# Patient Record
Sex: FEMALE | Race: WHITE | NOT HISPANIC OR LATINO | Employment: UNEMPLOYED | URBAN - NONMETROPOLITAN AREA
[De-identification: names, ages, dates, MRNs, and addresses within clinical notes are randomized per-mention and may not be internally consistent; named-entity substitution may affect disease eponyms.]

---

## 2019-02-02 ENCOUNTER — HOSPITAL ENCOUNTER (EMERGENCY)
Facility: HOSPITAL | Age: 19
Discharge: HOME | End: 2019-02-02
Attending: NURSE PRACTITIONER | Admitting: NURSE PRACTITIONER
Payer: COMMERCIAL

## 2019-02-02 VITALS
RESPIRATION RATE: 16 BRPM | OXYGEN SATURATION: 100 % | TEMPERATURE: 98.8 F | DIASTOLIC BLOOD PRESSURE: 68 MMHG | HEART RATE: 79 BPM | SYSTOLIC BLOOD PRESSURE: 102 MMHG

## 2019-02-02 DIAGNOSIS — Z20.818 EXPOSURE TO STREP THROAT: ICD-10-CM

## 2019-02-02 DIAGNOSIS — J02.9 ACUTE PHARYNGITIS, UNSPECIFIED ETIOLOGY: ICD-10-CM

## 2019-02-02 PROCEDURE — 99203 OFFICE O/P NEW LOW 30 MIN: CPT | Mod: Z6 | Performed by: NURSE PRACTITIONER

## 2019-02-02 PROCEDURE — 25000132 ZZH RX MED GY IP 250 OP 250 PS 637: Performed by: NURSE PRACTITIONER

## 2019-02-02 PROCEDURE — G0463 HOSPITAL OUTPT CLINIC VISIT: HCPCS

## 2019-02-02 RX ORDER — AZITHROMYCIN 250 MG/1
250 TABLET, FILM COATED ORAL DAILY
Qty: 4 TABLET | Refills: 0 | Status: SHIPPED | OUTPATIENT
Start: 2019-02-02 | End: 2019-08-06

## 2019-02-02 RX ORDER — AZITHROMYCIN 250 MG/1
500 TABLET, FILM COATED ORAL ONCE
Status: COMPLETED | OUTPATIENT
Start: 2019-02-02 | End: 2019-02-02

## 2019-02-02 RX ADMIN — AZITHROMYCIN 500 MG: 250 TABLET, FILM COATED ORAL at 20:44

## 2019-02-02 ASSESSMENT — ENCOUNTER SYMPTOMS
FEVER: 1
FATIGUE: 1
APPETITE CHANGE: 1
CHILLS: 1

## 2019-02-02 NOTE — ED AVS SNAPSHOT
[facility]  [address] [address]  Phone:  [phone]                                    Leanna Zapata   MRN: [id number]    Department:  [facility]   Date of Visit:  2/2/2019           After Visit Summary Signature Page    I have received my discharge instructions, and my questions have been answered. I have discussed any challenges I see with this plan with the nurse or doctor.    ..........................................................................................................................................  Patient/Patient Representative Signature      ..........................................................................................................................................  Patient Representative Print Name and Relationship to Patient    ..................................................               ................................................  Date                                   Time    ..........................................................................................................................................  Reviewed by Signature/Title    ...................................................              ..............................................  Date                                               Time          22EPIC Rev 08/18

## 2019-02-03 NOTE — ED PROVIDER NOTES
History     Chief Complaint   Patient presents with     Pharyngitis     HPI  Leanna Zapata is a 18 year old female who presents today with a CC of sore throat, fever, nausea and abdominal pain x 4 days.  Her SO just tested positive for strep throat.  She has been taking razia seltzer for pain with some improvement.      Allergies:  Allergies   Allergen Reactions     Bees      Penicillins      GI upset.       Problem List:    There are no active problems to display for this patient.       Past Medical History:    Past Medical History:   Diagnosis Date     Dysthymic disorder      Insomnia        Past Surgical History:    Past Surgical History:   Procedure Laterality Date     OTHER SURGICAL HISTORY      [id number],NO PREVIOUS SURGERY       Family History:    Family History   Problem Relation Age of Onset     Cancer Mother         Cancer,cervical, dx around age of 34yo     Cancer Maternal Grandmother         Cancer,lymph node and breast cancer       Social History:  Marital Status:  Single [1]  Social History     Tobacco Use     Smoking status: Never Smoker     Smokeless tobacco: Never Used   Substance Use Topics     Alcohol use: No     Drug use: Unknown     Types: Other     Comment: Drug use: No        Medications:      azithromycin (ZITHROMAX Z-SHANNAN) 250 MG tablet   sodium-potassium bicarbonate (RAZIA-SELTZER GOLD) TBEF solu-tab         Review of Systems   Constitutional: Positive for appetite change, chills, fatigue and fever.   HENT: Positive for sore throat.    Respiratory: Positive for cough (mild).    Gastrointestinal: Positive for abdominal pain and nausea.   Genitourinary: Negative for dysuria.   Musculoskeletal: Positive for arthralgias and myalgias.   Skin: Negative for rash.   Neurological: Positive for headaches.       Physical Exam   BP: 102/68  Pulse: 79  Temp: 98.8  F (37.1  C)  Resp: 16  SpO2: 100 %      Physical Exam   Constitutional: She appears well-developed. She is cooperative.  Non-toxic appearance.  "She does not appear ill.   HENT:   Head: Normocephalic and atraumatic.   Right Ear: Tympanic membrane, external ear and ear canal normal.   Left Ear: Tympanic membrane, external ear and ear canal normal.   Mouth/Throat: Uvula is midline. Posterior oropharyngeal erythema present. Tonsils are 1+ on the right. Tonsils are 1+ on the left.   Cardiovascular: Normal rate and regular rhythm.   Pulmonary/Chest: Effort normal and breath sounds normal.   Abdominal: Soft. Bowel sounds are normal. There is no hepatosplenomegaly. There is generalized tenderness (mild). There is no rigidity, no rebound and no guarding.   No peritoneal signs   Neurological: She is alert.   Nursing note and vitals reviewed.      ED Course        Procedures    No results found for this or any previous visit.    Assessments & Plan (with Medical Decision Making)     I have reviewed the nursing notes.    I have reviewed the findings, diagnosis, plan and need for follow up with the patient.  ASSESSMENT / PLAN:  (J02.9) Acute pharyngitis, unspecified etiology  Comment: SO tested positive, symptoms are same as his, they live together, will treat  Plan:  Zithromax as prescribed    Warm salt water gargles several times per day   Ibuprofen and tylenol per package directions for pain/fever   Lozenges per package directions as needed   Stay well hydrated, wash hands frequently, get plenty of rest   You are contagious for the first 24 hours after starting treatment   Discard toothbrush after 24-48 hours and get a new one   Patient verbally educated and given appropriate education sheets for their diagnoses and has no questions.   Return to ED/UC if symptoms increase or concerns develop, red flag symptoms as discussed and per discharge instructions:    increasing throat pain, trouble swallowing, \"hot potato voice\", drooling, shortness of breath/airway compromise   Follow up with your Primary Care provider if symptoms do not improve in 2-3 days      (Z20.004) " Exposure to strep throat         Medication List      Started    azithromycin 250 MG tablet  Commonly known as:  ZITHROMAX Z-SHANNAN  250 mg, Oral, DAILY            Final diagnoses:   Acute pharyngitis, unspecified etiology   Exposure to strep throat       2/2/2019   [facility]     Saundra Cantrell NP  02/04/19 0642

## 2019-02-04 ASSESSMENT — ENCOUNTER SYMPTOMS
ABDOMINAL PAIN: 1
COUGH: 1
NAUSEA: 1
DYSURIA: 0
SORE THROAT: 1
MYALGIAS: 1
ARTHRALGIAS: 1
HEADACHES: 1

## 2019-04-12 ENCOUNTER — APPOINTMENT (OUTPATIENT)
Dept: GENERAL RADIOLOGY | Facility: HOSPITAL | Age: 19
End: 2019-04-12
Attending: FAMILY MEDICINE
Payer: COMMERCIAL

## 2019-04-12 ENCOUNTER — HOSPITAL ENCOUNTER (EMERGENCY)
Facility: HOSPITAL | Age: 19
Discharge: HOME | End: 2019-04-12
Attending: FAMILY MEDICINE | Admitting: FAMILY MEDICINE
Payer: COMMERCIAL

## 2019-04-12 VITALS
DIASTOLIC BLOOD PRESSURE: 64 MMHG | RESPIRATION RATE: 20 BRPM | WEIGHT: 140.43 LBS | SYSTOLIC BLOOD PRESSURE: 114 MMHG | OXYGEN SATURATION: 99 % | TEMPERATURE: 98.9 F

## 2019-04-12 DIAGNOSIS — F41.9 ANXIETY: ICD-10-CM

## 2019-04-12 DIAGNOSIS — R10.84 ABDOMINAL PAIN, GENERALIZED: Primary | ICD-10-CM

## 2019-04-12 DIAGNOSIS — K29.70 GASTRITIS WITHOUT BLEEDING, UNSPECIFIED CHRONICITY, UNSPECIFIED GASTRITIS TYPE: ICD-10-CM

## 2019-04-12 LAB
ALBUMIN SERPL-MCNC: 4.8 G/DL (ref 3.4–5)
ALBUMIN UR-MCNC: NEGATIVE MG/DL
ALP SERPL-CCNC: 61 U/L (ref 40–150)
ALT SERPL W P-5'-P-CCNC: 20 U/L (ref 0–50)
ANION GAP SERPL CALCULATED.3IONS-SCNC: 10 MMOL/L (ref 3–14)
APPEARANCE UR: CLEAR
AST SERPL W P-5'-P-CCNC: 12 U/L (ref 0–35)
BACTERIA #/AREA URNS HPF: ABNORMAL /HPF
BASOPHILS # BLD AUTO: 0 10E9/L (ref 0–0.2)
BASOPHILS NFR BLD AUTO: 0.4 %
BILIRUB SERPL-MCNC: 0.7 MG/DL (ref 0.2–1.3)
BILIRUB UR QL STRIP: NEGATIVE
BUN SERPL-MCNC: 9 MG/DL (ref 7–30)
CALCIUM SERPL-MCNC: 9.8 MG/DL (ref 8.5–10.1)
CHLORIDE SERPL-SCNC: 108 MMOL/L (ref 96–110)
CO2 SERPL-SCNC: 22 MMOL/L (ref 20–32)
COLOR UR AUTO: ABNORMAL
CREAT SERPL-MCNC: 0.85 MG/DL (ref 0.5–1)
D DIMER PPP DDU-MCNC: <200 NG/ML D-DU (ref 0–300)
DIFFERENTIAL METHOD BLD: NORMAL
EOSINOPHIL # BLD AUTO: 0 10E9/L (ref 0–0.7)
EOSINOPHIL NFR BLD AUTO: 0.3 %
ERYTHROCYTE [DISTWIDTH] IN BLOOD BY AUTOMATED COUNT: 12.7 % (ref 10–15)
GFR SERPL CREATININE-BSD FRML MDRD: >90 ML/MIN/{1.73_M2}
GLUCOSE SERPL-MCNC: 128 MG/DL (ref 70–99)
GLUCOSE UR STRIP-MCNC: NEGATIVE MG/DL
HCT VFR BLD AUTO: 41.4 % (ref 35–47)
HGB BLD-MCNC: 14.7 G/DL (ref 11.7–15.7)
HGB UR QL STRIP: NEGATIVE
IMM GRANULOCYTES # BLD: 0 10E9/L (ref 0–0.4)
IMM GRANULOCYTES NFR BLD: 0.3 %
KETONES UR STRIP-MCNC: 5 MG/DL
LEUKOCYTE ESTERASE UR QL STRIP: NEGATIVE
LYMPHOCYTES # BLD AUTO: 2.9 10E9/L (ref 0.8–5.3)
LYMPHOCYTES NFR BLD AUTO: 36.8 %
MCH RBC QN AUTO: 29.8 PG (ref 26.5–33)
MCHC RBC AUTO-ENTMCNC: 35.5 G/DL (ref 31.5–36.5)
MCV RBC AUTO: 84 FL (ref 78–100)
MONOCYTES # BLD AUTO: 0.5 10E9/L (ref 0–1.3)
MONOCYTES NFR BLD AUTO: 5.8 %
MUCOUS THREADS #/AREA URNS LPF: PRESENT /LPF
NEUTROPHILS # BLD AUTO: 4.4 10E9/L (ref 1.6–8.3)
NEUTROPHILS NFR BLD AUTO: 56.4 %
NITRATE UR QL: NEGATIVE
NRBC # BLD AUTO: 0 10*3/UL
NRBC BLD AUTO-RTO: 0 /100
PH UR STRIP: 6.5 PH (ref 4.7–8)
PLATELET # BLD AUTO: 219 10E9/L (ref 150–450)
POTASSIUM SERPL-SCNC: 3.2 MMOL/L (ref 3.4–5.3)
PROT SERPL-MCNC: 8.6 G/DL (ref 6.8–8.8)
RBC # BLD AUTO: 4.93 10E12/L (ref 3.8–5.2)
RBC #/AREA URNS AUTO: 1 /HPF (ref 0–2)
SODIUM SERPL-SCNC: 140 MMOL/L (ref 133–144)
SOURCE: ABNORMAL
SP GR UR STRIP: 1.01 (ref 1–1.03)
UROBILINOGEN UR STRIP-MCNC: NORMAL MG/DL (ref 0–2)
WBC # BLD AUTO: 7.8 10E9/L (ref 4–11)
WBC #/AREA URNS AUTO: <1 /HPF (ref 0–5)

## 2019-04-12 PROCEDURE — 25000125 ZZHC RX 250: Performed by: FAMILY MEDICINE

## 2019-04-12 PROCEDURE — 80053 COMPREHEN METABOLIC PANEL: CPT | Performed by: FAMILY MEDICINE

## 2019-04-12 PROCEDURE — 25000132 ZZH RX MED GY IP 250 OP 250 PS 637: Performed by: FAMILY MEDICINE

## 2019-04-12 PROCEDURE — 85379 FIBRIN DEGRADATION QUANT: CPT | Performed by: FAMILY MEDICINE

## 2019-04-12 PROCEDURE — 99284 EMERGENCY DEPT VISIT MOD MDM: CPT | Mod: Z6 | Performed by: FAMILY MEDICINE

## 2019-04-12 PROCEDURE — 96361 HYDRATE IV INFUSION ADD-ON: CPT

## 2019-04-12 PROCEDURE — 25800030 ZZH RX IP 258 OP 636: Performed by: FAMILY MEDICINE

## 2019-04-12 PROCEDURE — 81001 URINALYSIS AUTO W/SCOPE: CPT | Performed by: FAMILY MEDICINE

## 2019-04-12 PROCEDURE — 96374 THER/PROPH/DIAG INJ IV PUSH: CPT

## 2019-04-12 PROCEDURE — 36415 COLL VENOUS BLD VENIPUNCTURE: CPT | Performed by: FAMILY MEDICINE

## 2019-04-12 PROCEDURE — 99284 EMERGENCY DEPT VISIT MOD MDM: CPT | Mod: 25

## 2019-04-12 PROCEDURE — 85025 COMPLETE CBC W/AUTO DIFF WBC: CPT | Performed by: FAMILY MEDICINE

## 2019-04-12 PROCEDURE — 74019 RADEX ABDOMEN 2 VIEWS: CPT | Mod: TC

## 2019-04-12 PROCEDURE — 25000128 H RX IP 250 OP 636: Performed by: FAMILY MEDICINE

## 2019-04-12 PROCEDURE — 71046 X-RAY EXAM CHEST 2 VIEWS: CPT | Mod: TC

## 2019-04-12 RX ORDER — POTASSIUM CHLORIDE 20MEQ/15ML
40 LIQUID (ML) ORAL ONCE
Status: COMPLETED | OUTPATIENT
Start: 2019-04-12 | End: 2019-04-12

## 2019-04-12 RX ORDER — SODIUM CHLORIDE 9 MG/ML
INJECTION, SOLUTION INTRAVENOUS CONTINUOUS
Status: DISCONTINUED | OUTPATIENT
Start: 2019-04-12 | End: 2019-04-13 | Stop reason: HOSPADM

## 2019-04-12 RX ORDER — FAMOTIDINE 20 MG/1
20 TABLET, FILM COATED ORAL 2 TIMES DAILY
Qty: 60 TABLET | Refills: 0 | Status: SHIPPED | OUTPATIENT
Start: 2019-04-12

## 2019-04-12 RX ADMIN — POTASSIUM CHLORIDE 40 MEQ: 20 SOLUTION ORAL at 21:38

## 2019-04-12 RX ADMIN — SODIUM CHLORIDE: 9 INJECTION, SOLUTION INTRAVENOUS at 21:27

## 2019-04-12 RX ADMIN — FAMOTIDINE 20 MG: 10 INJECTION, SOLUTION INTRAVENOUS at 21:43

## 2019-04-12 RX ADMIN — SODIUM CHLORIDE 1000 ML: 9 INJECTION, SOLUTION INTRAVENOUS at 20:36

## 2019-04-12 ASSESSMENT — ENCOUNTER SYMPTOMS
SHORTNESS OF BREATH: 1
CHILLS: 1
ACTIVITY CHANGE: 1
NEUROLOGICAL NEGATIVE: 1
CARDIOVASCULAR NEGATIVE: 1
MUSCULOSKELETAL NEGATIVE: 1
CHEST TIGHTNESS: 1
EYES NEGATIVE: 1
APPETITE CHANGE: 0
PSYCHIATRIC NEGATIVE: 1
HEMATOLOGIC/LYMPHATIC NEGATIVE: 1
FEVER: 0
ABDOMINAL PAIN: 1
DIARRHEA: 1
FATIGUE: 1
NAUSEA: 1

## 2019-04-12 NOTE — ED AVS SNAPSHOT
[facility]  [address] [address]  Phone:  [phone]                                    Leanna Zapata   MRN: [id number]    Department:  [facility]   Date of Visit:  4/12/2019           After Visit Summary Signature Page    I have received my discharge instructions, and my questions have been answered. I have discussed any challenges I see with this plan with the nurse or doctor.    ..........................................................................................................................................  Patient/Patient Representative Signature      ..........................................................................................................................................  Patient Representative Print Name and Relationship to Patient    ..................................................               ................................................  Date                                   Time    ..........................................................................................................................................  Reviewed by Signature/Title    ...................................................              ..............................................  Date                                               Time          22EPIC Rev 08/18

## 2019-04-13 NOTE — ED NOTES
"Pt has scarring on left inner arm from cutting. States \" my circulation is all screwed up in that arm from scars\". Pt states she has never had an IV and begins to hyperventilate and cry when nurse even looking for a site. Able to get an IV into her right AC.  "

## 2019-04-13 NOTE — ED PROVIDER NOTES
"  History     Chief Complaint   Patient presents with     Abdominal Pain     Diarrhea     Dizziness     HPI  Leanna Zapata is a 18 year old female who presents the emergency room with increasing left lung pain and mid chest pain.  She states she has been more lightheaded than usual and that the lung pain is worsened in the last 2-3 weeks.  It did not suddenly worsened today.  She notes that she has \"extreme back pain and uses crutches when she gets up and is not a very active person.  She has a history of being a contortion S and states that she  thinks that her back pain is likely from this activity.  She has not had an official diagnosis.  She has not had any workup for her back.    Patient does admit that she has been on cimetidine in the past for what was thought to be ulcer disease.    Allergies:  Allergies   Allergen Reactions     Bees      Penicillins      GI upset.       Problem List:    There are no active problems to display for this patient.       Past Medical History:    Past Medical History:   Diagnosis Date     Dysthymic disorder      Insomnia        Past Surgical History:    Past Surgical History:   Procedure Laterality Date     OTHER SURGICAL HISTORY      [id number],NO PREVIOUS SURGERY       Family History:    Family History   Problem Relation Age of Onset     Cancer Mother         Cancer,cervical, dx around age of 34yo     Cancer Maternal Grandmother         Cancer,lymph node and breast cancer       Social History:  Marital Status:  Single [1]  Social History     Tobacco Use     Smoking status: Current Every Day Smoker     Packs/day: 0.25     Years: 4.00     Pack years: 1.00     Smokeless tobacco: Never Used     Tobacco comment: States she is cutting back   Substance Use Topics     Alcohol use: No     Drug use: Yes     Types: Other, Marijuana     Comment: Drug use: No        Medications:      famotidine (PEPCID) 20 MG tablet   azithromycin (ZITHROMAX Z-SHANNAN) 250 MG tablet   sodium-potassium bicarbonate " ----- Message from Mejia Snider MD sent at 8/1/2017  4:51 PM CDT -----  Triglycerides elevated. Watch diet i.e. low-fat and low carb diet. Repeat lipid panel in 6 months  Blood sugars normal  The rest of lab work within normal range.   (RAZIA-SELTZER GOLD) TBEF solu-tab         Review of Systems   Constitutional: Positive for activity change, chills and fatigue. Negative for appetite change and fever.   HENT: Negative.    Eyes: Negative.    Respiratory: Positive for chest tightness and shortness of breath.    Cardiovascular: Negative.    Gastrointestinal: Positive for abdominal pain, diarrhea and nausea.   Genitourinary: Negative.    Musculoskeletal: Negative.    Neurological: Negative.    Hematological: Negative.    Psychiatric/Behavioral: Negative.        Physical Exam   BP: 100/71  Heart Rate: (!) 135  Temp: 98.2  F (36.8  C)  Resp: 16  Weight: 63.7 kg (140 lb 6.9 oz)  SpO2: 99 %      Physical Exam   Constitutional: She is oriented to person, place, and time. She appears well-developed and well-nourished. No distress.   HENT:   Head: Normocephalic and atraumatic.   Right Ear: External ear normal.   Left Ear: External ear normal.   Nose: Nose normal.   Mouth/Throat: Oropharynx is clear and moist.   Eyes: Pupils are equal, round, and reactive to light. Conjunctivae and EOM are normal.   Neck: Normal range of motion. Neck supple. No thyromegaly present.   Cardiovascular: Normal rate, regular rhythm and normal heart sounds. Exam reveals no gallop.   No murmur heard.  Pulmonary/Chest: Effort normal and breath sounds normal. No stridor. No respiratory distress. She has no wheezes.   Abdominal: Soft. Bowel sounds are normal. She exhibits no distension. There is no tenderness. There is no guarding.   Musculoskeletal: Normal range of motion.   Lymphadenopathy:     She has no cervical adenopathy.   Neurological: She is alert and oriented to person, place, and time.   Skin: Skin is warm and dry. Capillary refill takes less than 2 seconds. She is not diaphoretic.   Nursing note and vitals reviewed.      ED Course   Patient seen and examined. Labs ordered. CXR AND ABD XR done.   ED Course as of Apr 12 2259 Fri Apr 12, 2019   2130 Potassium ordered    Potassium(!): 3.2   2226 CXR and ABD Xray unremarkable.         Procedures          Discussed results with patient and her significant other.  No obvious abnormalities are found today.  She did have a low potassium and we treated that.  Her chest and abdominal x-rays are negative.    We will send her home on Pepcid 20 mg p.o. twice daily Rx given that she can pick this up over-the-counter and she was told that.    We discussed her activity level and I strongly encouraged her to consider water therapy at a local pool to help with her strength until she is able to do some inconsistent walking in her activity.    Patient asked about anxiety medications as she has severe anxiety I suggested to her to consider ongoing therapy and she has been in therapy and on medications in the past.  Did talk to her about yoga which she is doing intermittently with her significant other.  Did also talk to her about calming techniques.  Suggested meditation and focusing exercises so that she can bring herself down fairly rapidly.  She was comfortable with that plan.  She will follow-up if any worsening or change in her symptoms.         Results for orders placed or performed during the hospital encounter of 04/12/19 (from the past 24 hour(s))   CBC with platelets differential   Result Value Ref Range    WBC 7.8 4.0 - 11.0 10e9/L    RBC Count 4.93 3.8 - 5.2 10e12/L    Hemoglobin 14.7 11.7 - 15.7 g/dL    Hematocrit 41.4 35.0 - 47.0 %    MCV 84 78 - 100 fl    MCH 29.8 26.5 - 33.0 pg    MCHC 35.5 31.5 - 36.5 g/dL    RDW 12.7 10.0 - 15.0 %    Platelet Count 219 150 - 450 10e9/L    Diff Method Automated Method     % Neutrophils 56.4 %    % Lymphocytes 36.8 %    % Monocytes 5.8 %    % Eosinophils 0.3 %    % Basophils 0.4 %    % Immature Granulocytes 0.3 %    Nucleated RBCs 0 0 /100    Absolute Neutrophil 4.4 1.6 - 8.3 10e9/L    Absolute Lymphocytes 2.9 0.8 - 5.3 10e9/L    Absolute Monocytes 0.5 0.0 - 1.3 10e9/L    Absolute Eosinophils 0.0  0.0 - 0.7 10e9/L    Absolute Basophils 0.0 0.0 - 0.2 10e9/L    Abs Immature Granulocytes 0.0 0 - 0.4 10e9/L    Absolute Nucleated RBC 0.0    Comprehensive metabolic panel   Result Value Ref Range    Sodium 140 133 - 144 mmol/L    Potassium 3.2 (L) 3.4 - 5.3 mmol/L    Chloride 108 96 - 110 mmol/L    Carbon Dioxide 22 20 - 32 mmol/L    Anion Gap 10 3 - 14 mmol/L    Glucose 128 (H) 70 - 99 mg/dL    Urea Nitrogen 9 7 - 30 mg/dL    Creatinine 0.85 0.50 - 1.00 mg/dL    GFR Estimate >90 >60 mL/min/[1.73_m2]    GFR Estimate If Black >90 >60 mL/min/[1.73_m2]    Calcium 9.8 8.5 - 10.1 mg/dL    Bilirubin Total 0.7 0.2 - 1.3 mg/dL    Albumin 4.8 3.4 - 5.0 g/dL    Protein Total 8.6 6.8 - 8.8 g/dL    Alkaline Phosphatase 61 40 - 150 U/L    ALT 20 0 - 50 U/L    AST 12 0 - 35 U/L   D-Dimer ([facility])   Result Value Ref Range    D-Dimer ng/mL <200 0 - 300 ng/ml D-DU   UA with Microscopic reflex to Culture   Result Value Ref Range    Color Urine Straw     Appearance Urine Clear     Glucose Urine Negative NEG^Negative mg/dL    Bilirubin Urine Negative NEG^Negative    Ketones Urine 5 (A) NEG^Negative mg/dL    Specific Gravity Urine 1.007 1.003 - 1.035    Blood Urine Negative NEG^Negative    pH Urine 6.5 4.7 - 8.0 pH    Protein Albumin Urine Negative NEG^Negative mg/dL    Urobilinogen mg/dL Normal 0.0 - 2.0 mg/dL    Nitrite Urine Negative NEG^Negative    Leukocyte Esterase Urine Negative NEG^Negative    Source Midstream Urine     WBC Urine <1 0 - 5 /HPF    RBC Urine 1 0 - 2 /HPF    Bacteria Urine None (A) NEG^Negative /HPF    Mucous Urine Present (A) NEG^Negative /LPF       Medications   sodium chloride 0.9% infusion ( Intravenous Stopped 4/12/19 8999)   famotidine (PEPCID) injection 20 mg (20 mg Intravenous Given 4/12/19 2143)   0.9% sodium chloride BOLUS (0 mLs Intravenous Stopped 4/12/19 2127)   potassium chloride (KAYCIEL) solution 40 mEq (40 mEq Oral Given 4/12/19 2138)       Assessments & Plan (with Medical Decision Making)     I  have reviewed the nursing notes.    I have reviewed the findings, diagnosis, plan and need for follow up with the patient.  See note above.        Medication List      Started    famotidine 20 MG tablet  Commonly known as:  PEPCID  20 mg, Oral, 2 TIMES DAILY            Final diagnoses:   Abdominal pain, generalized   Anxiety   Gastritis without bleeding, unspecified chronicity, unspecified gastritis type       4/12/2019   [facility]     Mirian Mercer MD  04/12/19 0873

## 2019-04-13 NOTE — DISCHARGE INSTRUCTIONS
Consider getting regular exercises in a local pool as we discussed.  Start doing regular meditation and/or focusing exercises to help you with your anxiety.  Take Pepcid 20 mg  twice daily this may help with some of the abdominal discomfort.  Follow-up here if your symptoms are worsening.  Good luck take care of yourself.

## 2019-04-13 NOTE — ED TRIAGE NOTES
Patient presents with complaints of lightheadedness, fevers and bilat upper abdominal pain/lower rib pain.  States she has been feeling ill for 3-4 weeks.  HR in triage 120-140's

## 2019-04-13 NOTE — ED NOTES
Discharge instructions reviewed. Script given for pepcid. States her pain is between a 2-3/10. To home with significant other.

## 2019-08-06 ENCOUNTER — HOSPITAL ENCOUNTER (EMERGENCY)
Facility: HOSPITAL | Age: 19
Discharge: HOME | End: 2019-08-06
Attending: PHYSICIAN ASSISTANT | Admitting: PHYSICIAN ASSISTANT
Payer: COMMERCIAL

## 2019-08-06 VITALS
SYSTOLIC BLOOD PRESSURE: 111 MMHG | RESPIRATION RATE: 14 BRPM | OXYGEN SATURATION: 100 % | DIASTOLIC BLOOD PRESSURE: 72 MMHG | TEMPERATURE: 98 F

## 2019-08-06 DIAGNOSIS — N12 PYELONEPHRITIS: ICD-10-CM

## 2019-08-06 LAB
ALBUMIN UR-MCNC: NEGATIVE MG/DL
APPEARANCE UR: CLEAR
BACTERIA #/AREA URNS HPF: ABNORMAL /HPF
BILIRUB UR QL STRIP: NEGATIVE
COLOR UR AUTO: ABNORMAL
GLUCOSE UR STRIP-MCNC: NEGATIVE MG/DL
HGB UR QL STRIP: ABNORMAL
KETONES UR STRIP-MCNC: NEGATIVE MG/DL
LEUKOCYTE ESTERASE UR QL STRIP: ABNORMAL
MUCOUS THREADS #/AREA URNS LPF: PRESENT /LPF
NITRATE UR QL: NEGATIVE
PH UR STRIP: 6.5 PH (ref 4.7–8)
RBC #/AREA URNS AUTO: <1 /HPF (ref 0–2)
SOURCE: ABNORMAL
SP GR UR STRIP: 1.01 (ref 1–1.03)
UROBILINOGEN UR STRIP-MCNC: NORMAL MG/DL (ref 0–2)
WBC #/AREA URNS AUTO: 70 /HPF (ref 0–5)

## 2019-08-06 PROCEDURE — 81001 URINALYSIS AUTO W/SCOPE: CPT | Performed by: PHYSICIAN ASSISTANT

## 2019-08-06 PROCEDURE — 99283 EMERGENCY DEPT VISIT LOW MDM: CPT

## 2019-08-06 PROCEDURE — 99283 EMERGENCY DEPT VISIT LOW MDM: CPT | Mod: Z6 | Performed by: PHYSICIAN ASSISTANT

## 2019-08-06 PROCEDURE — 81025 URINE PREGNANCY TEST: CPT | Performed by: PHYSICIAN ASSISTANT

## 2019-08-06 RX ORDER — SULFAMETHOXAZOLE/TRIMETHOPRIM 800-160 MG
1 TABLET ORAL 2 TIMES DAILY
Qty: 20 TABLET | Refills: 0 | Status: SHIPPED | OUTPATIENT
Start: 2019-08-06 | End: 2019-12-14

## 2019-08-06 RX ORDER — ONDANSETRON 8 MG/1
8 TABLET, ORALLY DISINTEGRATING ORAL EVERY 8 HOURS PRN
Qty: 10 TABLET | Refills: 1 | Status: SHIPPED | OUTPATIENT
Start: 2019-08-06 | End: 2019-12-14

## 2019-08-06 NOTE — ED AVS SNAPSHOT
[facility]  [address] [address]  Phone:  [phone]                                    Leanna Zapata   MRN: [id number]    Department:  [facility]   Date of Visit:  8/6/2019           After Visit Summary Signature Page    I have received my discharge instructions, and my questions have been answered. I have discussed any challenges I see with this plan with the nurse or doctor.    ..........................................................................................................................................  Patient/Patient Representative Signature      ..........................................................................................................................................  Patient Representative Print Name and Relationship to Patient    ..................................................               ................................................  Date                                   Time    ..........................................................................................................................................  Reviewed by Signature/Title    ...................................................              ..............................................  Date                                               Time          22EPIC Rev 08/18

## 2019-08-06 NOTE — ED NOTES
Pt to the ED with sig other with c/o 3-4 weeks of dysuria, burning when urinating, low abd pressure, low back pain started today.  States she thought she had a yeast infection.  Pt to the BR to give midstream urine sample. Assessment complete. Call light given. Sig other at side.

## 2019-08-07 LAB — HCG UR QL: NEGATIVE

## 2019-08-07 ASSESSMENT — ENCOUNTER SYMPTOMS
BLOOD IN STOOL: 0
NAUSEA: 1
ABDOMINAL PAIN: 0
DYSURIA: 1
FREQUENCY: 1
FLANK PAIN: 1
DIARRHEA: 0
CONSTIPATION: 0
FEVER: 0
VOMITING: 0

## 2019-08-07 NOTE — ED PROVIDER NOTES
History     Chief Complaint   Patient presents with     UTI     c/o uti symptoms with lower pain     HPI  Leanna Zapata is a 19 year old female who presents emergency department with complaints of urinary frequency, urgency, dysuria with associated nausea and bilateral flank pain.  She has not had a fever and has not been vomiting.  She denies abdominal pain.  Patient does not get her periods due to the use of Depo-Provera.    Allergies:  Allergies   Allergen Reactions     Bees      Penicillins      GI upset.       Problem List:    There are no active problems to display for this patient.       Past Medical History:    Past Medical History:   Diagnosis Date     Dysthymic disorder      Insomnia        Past Surgical History:    Past Surgical History:   Procedure Laterality Date     OTHER SURGICAL HISTORY      [id number],NO PREVIOUS SURGERY       Family History:    Family History   Problem Relation Age of Onset     Cancer Mother         Cancer,cervical, dx around age of 34yo     Cancer Maternal Grandmother         Cancer,lymph node and breast cancer       Social History:  Marital Status:  Single [1]  Social History     Tobacco Use     Smoking status: Current Every Day Smoker     Packs/day: 0.25     Years: 4.00     Pack years: 1.00     Smokeless tobacco: Never Used     Tobacco comment: States she is cutting back   Substance Use Topics     Alcohol use: No     Drug use: Yes     Types: Other, Marijuana     Comment: Drug use: No        Medications:      famotidine (PEPCID) 20 MG tablet   omeprazole (PRILOSEC) 20 MG DR capsule   ondansetron (ZOFRAN-ODT) 8 MG ODT tab   sulfamethoxazole-trimethoprim (BACTRIM DS) 800-160 MG tablet   sodium-potassium bicarbonate (RAZIA-SELTZER GOLD) TBEF solu-tab         Review of Systems   Constitutional: Negative for fever.   Gastrointestinal: Positive for nausea. Negative for abdominal pain, blood in stool, constipation, diarrhea and vomiting.   Genitourinary: Positive for dysuria, flank pain,  frequency and urgency.       Physical Exam   BP: 115/67  Heart Rate: 116  Temp: 98  F (36.7  C)  Resp: 16  SpO2: 100 %      Physical Exam   Constitutional: No distress.   HENT:   Head: Atraumatic.   Mouth/Throat: Oropharynx is clear and moist. No oropharyngeal exudate.   Eyes: Pupils are equal, round, and reactive to light. No scleral icterus.   Cardiovascular: Normal heart sounds and intact distal pulses.   Pulmonary/Chest: Breath sounds normal. No respiratory distress.   Abdominal: Soft. Bowel sounds are normal. There is no tenderness. There is CVA tenderness (Moderate CVA tenderness to palpation, right greater than left.).   Musculoskeletal: She exhibits no edema or tenderness.   Skin: Skin is warm. No rash noted. She is not diaphoretic.       ED Course        Procedures  Symptoms consistent with pyelonephritis.  Discussed with patient symptoms that would require immediate return to the emergency vomiting and high fever.           Results for orders placed or performed during the hospital encounter of 08/06/19 (from the past 24 hour(s))   UA reflex to Microscopic   Result Value Ref Range    Color Urine Straw     Appearance Urine Clear     Glucose Urine Negative NEG^Negative mg/dL    Bilirubin Urine Negative NEG^Negative    Ketones Urine Negative NEG^Negative mg/dL    Specific Gravity Urine 1.007 1.003 - 1.035    Blood Urine Small (A) NEG^Negative    pH Urine 6.5 4.7 - 8.0 pH    Protein Albumin Urine Negative NEG^Negative mg/dL    Urobilinogen mg/dL Normal 0.0 - 2.0 mg/dL    Nitrite Urine Negative NEG^Negative    Leukocyte Esterase Urine Large (A) NEG^Negative    Source Midstream Urine     RBC Urine <1 0 - 2 /HPF    WBC Urine 70 (H) 0 - 5 /HPF    Bacteria Urine None (A) NEG^Negative /HPF    Mucous Urine Present (A) NEG^Negative /LPF       Medications - No data to display    Assessments & Plan (with Medical Decision Making)     I have reviewed the nursing notes.    I have reviewed the findings, diagnosis, plan and  need for follow up with the patient.       Medication List      Started    ondansetron 8 MG ODT tab  Commonly known as:  ZOFRAN-ODT  8 mg, Oral, EVERY 8 HOURS PRN     sulfamethoxazole-trimethoprim 800-160 MG tablet  Commonly known as:  BACTRIM DS  1 tablet, Oral, 2 TIMES DAILY            Final diagnoses:   Pyelonephritis     MARIA EUGENIA Garcia on 8/7/2019 at 8:55 AM   8/6/2019   [facility]     Aniket Bernard PA  08/07/19 0888

## 2019-12-14 ENCOUNTER — HOSPITAL ENCOUNTER (EMERGENCY)
Facility: HOSPITAL | Age: 19
Discharge: HOME | End: 2019-12-14
Attending: INTERNAL MEDICINE | Admitting: INTERNAL MEDICINE
Payer: COMMERCIAL

## 2019-12-14 VITALS
RESPIRATION RATE: 18 BRPM | HEART RATE: 83 BPM | DIASTOLIC BLOOD PRESSURE: 78 MMHG | TEMPERATURE: 99.5 F | WEIGHT: 165.79 LBS | SYSTOLIC BLOOD PRESSURE: 106 MMHG | HEIGHT: 61 IN | OXYGEN SATURATION: 98 % | BODY MASS INDEX: 31.3 KG/M2

## 2019-12-14 DIAGNOSIS — F41.0 PANIC ATTACK: ICD-10-CM

## 2019-12-14 LAB
AMPHETAMINES UR QL: NOT DETECTED NG/ML
BARBITURATES UR QL SCN: NOT DETECTED NG/ML
BENZODIAZ UR QL SCN: NOT DETECTED NG/ML
BUPRENORPHINE UR QL: NOT DETECTED NG/ML
CANNABINOIDS UR QL: ABNORMAL NG/ML
COCAINE UR QL SCN: NOT DETECTED NG/ML
D-METHAMPHET UR QL: NOT DETECTED NG/ML
METHADONE UR QL SCN: NOT DETECTED NG/ML
OPIATES UR QL SCN: NOT DETECTED NG/ML
OXYCODONE UR QL SCN: NOT DETECTED NG/ML
PCP UR QL SCN: NOT DETECTED NG/ML
PROPOXYPH UR QL: NOT DETECTED NG/ML
TRICYCLICS UR QL SCN: NOT DETECTED NG/ML

## 2019-12-14 PROCEDURE — 99283 EMERGENCY DEPT VISIT LOW MDM: CPT

## 2019-12-14 PROCEDURE — 25000132 ZZH RX MED GY IP 250 OP 250 PS 637: Performed by: INTERNAL MEDICINE

## 2019-12-14 PROCEDURE — 99283 EMERGENCY DEPT VISIT LOW MDM: CPT | Mod: Z6 | Performed by: INTERNAL MEDICINE

## 2019-12-14 PROCEDURE — 80306 DRUG TEST PRSMV INSTRMNT: CPT | Performed by: INTERNAL MEDICINE

## 2019-12-14 PROCEDURE — 25000128 H RX IP 250 OP 636: Performed by: INTERNAL MEDICINE

## 2019-12-14 RX ORDER — ONDANSETRON 4 MG/1
4 TABLET, ORALLY DISINTEGRATING ORAL ONCE
Status: COMPLETED | OUTPATIENT
Start: 2019-12-14 | End: 2019-12-14

## 2019-12-14 RX ORDER — ZOLPIDEM TARTRATE 5 MG/1
5 TABLET ORAL
COMMUNITY
End: 2020-06-03

## 2019-12-14 RX ORDER — LORAZEPAM 1 MG/1
1 TABLET ORAL ONCE
Status: COMPLETED | OUTPATIENT
Start: 2019-12-14 | End: 2019-12-14

## 2019-12-14 RX ORDER — HYDROXYZINE HYDROCHLORIDE 50 MG/1
50 TABLET, FILM COATED ORAL 3 TIMES DAILY PRN
COMMUNITY
End: 2020-06-03

## 2019-12-14 RX ADMIN — LORAZEPAM 1 MG: 1 TABLET ORAL at 01:58

## 2019-12-14 RX ADMIN — ONDANSETRON 4 MG: 4 TABLET, ORALLY DISINTEGRATING ORAL at 01:58

## 2019-12-14 ASSESSMENT — MIFFLIN-ST. JEOR: SCORE: 1459.38

## 2019-12-14 NOTE — ED NOTES
Discharge teaching is done, AVS reviewed with pt, questions answered. Pt verbalized understanding and is agreeable with discharge plan. Pt discharged to home with her S.O.

## 2019-12-14 NOTE — ED NOTES
Pt presents with c/o Rt anterior chest pain and Lt arm pain and c/o severe anxiety/panic attack. Pt states she has a HX of anxiety attacks and took hydroxyzine 30 minutes PTA. Pt with dramatic increase in chest pain with manual pressure applied to Rt anterior chest.

## 2019-12-14 NOTE — ED AVS SNAPSHOT
[facility]  [address] [address]  Phone:  [phone]                                    Leanna Zapata   MRN: [id number]    Department:  [facility]   Date of Visit:  12/14/2019           After Visit Summary Signature Page    I have received my discharge instructions, and my questions have been answered. I have discussed any challenges I see with this plan with the nurse or doctor.    ..........................................................................................................................................  Patient/Patient Representative Signature      ..........................................................................................................................................  Patient Representative Print Name and Relationship to Patient    ..................................................               ................................................  Date                                   Time    ..........................................................................................................................................  Reviewed by Signature/Title    ...................................................              ..............................................  Date                                               Time          22EPIC Rev 08/18

## 2019-12-18 ASSESSMENT — ENCOUNTER SYMPTOMS
NERVOUS/ANXIOUS: 1
FEVER: 0
HEADACHES: 0
DIFFICULTY URINATING: 0
ABDOMINAL PAIN: 0
ARTHRALGIAS: 0
COLOR CHANGE: 0
NECK STIFFNESS: 0
SLEEP DISTURBANCE: 1
PALPITATIONS: 1
CONFUSION: 0
NAUSEA: 1
VOMITING: 0
EYE REDNESS: 0
SHORTNESS OF BREATH: 1

## 2019-12-18 NOTE — ED PROVIDER NOTES
History     Chief Complaint   Patient presents with     Chest Wall Pain     The history is provided by the patient.   Chest Pain   Pain location:  Unable to specify  Pain quality: sharp    Pain radiates to:  Does not radiate  Timing:  Sporadic  Progression:  Resolved  Chronicity:  Recurrent  Context: stress    Associated symptoms: nausea, palpitations and shortness of breath    Associated symptoms: no abdominal pain, no fever, no headache and no vomiting      Leanna Zapata is a 19 year old female who    Allergies:  Allergies   Allergen Reactions     Bees      Penicillins      GI upset.       Problem List:    There are no active problems to display for this patient.       Past Medical History:    Past Medical History:   Diagnosis Date     Dysthymic disorder      Insomnia        Past Surgical History:    Past Surgical History:   Procedure Laterality Date     OTHER SURGICAL HISTORY      [id number],NO PREVIOUS SURGERY       Family History:    Family History   Problem Relation Age of Onset     Cancer Mother         Cancer,cervical, dx around age of 34yo     Cancer Maternal Grandmother         Cancer,lymph node and breast cancer       Social History:  Marital Status:  Single [1]  Social History     Tobacco Use     Smoking status: Current Every Day Smoker     Packs/day: 0.25     Years: 4.00     Pack years: 1.00     Smokeless tobacco: Never Used     Tobacco comment: States she is cutting back   Substance Use Topics     Alcohol use: Yes     Comment: occationally     Drug use: Yes     Types: Marijuana, Other     Comment: zanax occationally from somewhere else        Medications:    famotidine (PEPCID) 20 MG tablet  hydrOXYzine (ATARAX) 50 MG tablet  omeprazole (PRILOSEC) 20 MG DR capsule  zolpidem (AMBIEN) 5 MG tablet  sodium-potassium bicarbonate (RAZIA-SELTZER GOLD) TBEF solu-tab          Review of Systems   Constitutional: Negative for fever.   HENT: Negative for congestion.    Eyes: Negative for redness.   Respiratory:  "Positive for shortness of breath.    Cardiovascular: Positive for chest pain and palpitations.   Gastrointestinal: Positive for nausea. Negative for abdominal pain and vomiting.   Genitourinary: Negative for difficulty urinating.   Musculoskeletal: Negative for arthralgias and neck stiffness.   Skin: Negative for color change.   Neurological: Negative for headaches.   Psychiatric/Behavioral: Positive for sleep disturbance. Negative for confusion. The patient is nervous/anxious.    All other systems reviewed and are negative.      Physical Exam   BP: 123/85  Pulse: 95  Heart Rate: 96  Temp: 99.4  F (37.4  C)  Resp: 22  Height: 154.9 cm (5' 1\")  Weight: 75.2 kg (165 lb 12.6 oz)  SpO2: 100 %      Physical Exam  Constitutional:       General: She is not in acute distress.     Appearance: She is not diaphoretic.   HENT:      Head: Atraumatic.      Mouth/Throat:      Pharynx: No oropharyngeal exudate.   Eyes:      General: No scleral icterus.     Pupils: Pupils are equal, round, and reactive to light.   Cardiovascular:      Heart sounds: Normal heart sounds.   Pulmonary:      Effort: No respiratory distress.      Breath sounds: Normal breath sounds.   Abdominal:      General: Bowel sounds are normal.      Palpations: Abdomen is soft.      Tenderness: There is no abdominal tenderness.   Musculoskeletal:         General: No tenderness.   Skin:     General: Skin is warm.      Findings: No rash.         ED Course        Procedures                  No results found for this or any previous visit (from the past 24 hour(s)).    Medications   LORazepam (ATIVAN) tablet 1 mg (1 mg Oral Given 12/14/19 0158)   ondansetron (ZOFRAN-ODT) ODT tab 4 mg (4 mg Oral Given 12/14/19 0158)       Assessments & Plan (with Medical Decision Making)   Chest pain due to panic attack, already resolved by the time of arrival in ER  Similar problem in the past and pt is aware that it is related to Panic attack  1 dose ativan + zofran given   D C home  I " have reviewed the nursing notes.    I have reviewed the findings, diagnosis, plan and need for follow up with the patient.      Discharge Medication List as of 12/14/2019  1:52 AM          Final diagnoses:   Panic attack       12/14/2019   [facility]     Antony Carpio MD  12/18/19 0239       Antony Carpio MD  12/24/19 2016

## 2019-12-23 ENCOUNTER — HOSPITAL ENCOUNTER (EMERGENCY)
Facility: HOSPITAL | Age: 19
Discharge: HOME | End: 2019-12-23
Attending: NURSE PRACTITIONER | Admitting: NURSE PRACTITIONER
Payer: COMMERCIAL

## 2019-12-23 ENCOUNTER — TRANSFERRED RECORDS (OUTPATIENT)
Dept: HEALTH INFORMATION MANAGEMENT | Facility: CLINIC | Age: 19
End: 2019-12-23

## 2019-12-23 VITALS
RESPIRATION RATE: 14 BRPM | TEMPERATURE: 98.6 F | DIASTOLIC BLOOD PRESSURE: 67 MMHG | WEIGHT: 160 LBS | SYSTOLIC BLOOD PRESSURE: 103 MMHG | HEART RATE: 76 BPM | BODY MASS INDEX: 30.23 KG/M2 | OXYGEN SATURATION: 99 %

## 2019-12-23 DIAGNOSIS — R07.89 CHEST WALL PAIN: Primary | ICD-10-CM

## 2019-12-23 DIAGNOSIS — Z72.89: ICD-10-CM

## 2019-12-23 LAB
ANION GAP SERPL CALCULATED.3IONS-SCNC: 8 MMOL/L (ref 3–14)
BASOPHILS # BLD AUTO: 0 10E9/L (ref 0–0.2)
BASOPHILS NFR BLD AUTO: 0.3 %
BUN SERPL-MCNC: 8 MG/DL (ref 7–30)
CALCIUM SERPL-MCNC: 9.2 MG/DL (ref 8.5–10.1)
CHLORIDE SERPL-SCNC: 113 MMOL/L (ref 94–109)
CO2 SERPL-SCNC: 21 MMOL/L (ref 20–32)
CREAT SERPL-MCNC: 0.84 MG/DL (ref 0.52–1.04)
D DIMER PPP FEU-MCNC: <0.3 UG/ML FEU (ref 0–0.5)
DIFFERENTIAL METHOD BLD: NORMAL
EOSINOPHIL # BLD AUTO: 0.1 10E9/L (ref 0–0.7)
EOSINOPHIL NFR BLD AUTO: 0.5 %
ERYTHROCYTE [DISTWIDTH] IN BLOOD BY AUTOMATED COUNT: 12.8 % (ref 10–15)
GFR SERPL CREATININE-BSD FRML MDRD: >90 ML/MIN/{1.73_M2}
GLUCOSE SERPL-MCNC: 94 MG/DL (ref 70–99)
HCT VFR BLD AUTO: 40.2 % (ref 35–47)
HGB BLD-MCNC: 14 G/DL (ref 11.7–15.7)
IMM GRANULOCYTES # BLD: 0 10E9/L (ref 0–0.4)
IMM GRANULOCYTES NFR BLD: 0.4 %
LYMPHOCYTES # BLD AUTO: 2.1 10E9/L (ref 0.8–5.3)
LYMPHOCYTES NFR BLD AUTO: 20.1 %
MCH RBC QN AUTO: 29 PG (ref 26.5–33)
MCHC RBC AUTO-ENTMCNC: 34.8 G/DL (ref 31.5–36.5)
MCV RBC AUTO: 83 FL (ref 78–100)
MONOCYTES # BLD AUTO: 0.6 10E9/L (ref 0–1.3)
MONOCYTES NFR BLD AUTO: 5.7 %
NEUTROPHILS # BLD AUTO: 7.5 10E9/L (ref 1.6–8.3)
NEUTROPHILS NFR BLD AUTO: 73 %
NRBC # BLD AUTO: 0 10*3/UL
NRBC BLD AUTO-RTO: 0 /100
PLATELET # BLD AUTO: 228 10E9/L (ref 150–450)
POTASSIUM SERPL-SCNC: 3.5 MMOL/L (ref 3.4–5.3)
RBC # BLD AUTO: 4.83 10E12/L (ref 3.8–5.2)
SODIUM SERPL-SCNC: 142 MMOL/L (ref 133–144)
TSH SERPL DL<=0.005 MIU/L-ACNC: 0.92 MU/L (ref 0.4–4)
WBC # BLD AUTO: 10.3 10E9/L (ref 4–11)

## 2019-12-23 PROCEDURE — 93010 ELECTROCARDIOGRAM REPORT: CPT | Performed by: INTERNAL MEDICINE

## 2019-12-23 PROCEDURE — 99284 EMERGENCY DEPT VISIT MOD MDM: CPT

## 2019-12-23 PROCEDURE — 85379 FIBRIN DEGRADATION QUANT: CPT | Performed by: NURSE PRACTITIONER

## 2019-12-23 PROCEDURE — 84443 ASSAY THYROID STIM HORMONE: CPT | Performed by: NURSE PRACTITIONER

## 2019-12-23 PROCEDURE — 80048 BASIC METABOLIC PNL TOTAL CA: CPT | Performed by: NURSE PRACTITIONER

## 2019-12-23 PROCEDURE — 36415 COLL VENOUS BLD VENIPUNCTURE: CPT | Performed by: NURSE PRACTITIONER

## 2019-12-23 PROCEDURE — 85025 COMPLETE CBC W/AUTO DIFF WBC: CPT | Performed by: NURSE PRACTITIONER

## 2019-12-23 PROCEDURE — 99284 EMERGENCY DEPT VISIT MOD MDM: CPT | Mod: Z6 | Performed by: NURSE PRACTITIONER

## 2019-12-23 PROCEDURE — 93005 ELECTROCARDIOGRAM TRACING: CPT

## 2019-12-23 ASSESSMENT — ENCOUNTER SYMPTOMS
PALPITATIONS: 0
SHORTNESS OF BREATH: 1
FEVER: 0
COUGH: 0
CHILLS: 0
ABDOMINAL PAIN: 0
FATIGUE: 0
VOMITING: 0
RHINORRHEA: 0
WHEEZING: 0
HEADACHES: 0
CONSTIPATION: 0
SORE THROAT: 0
CHEST TIGHTNESS: 0
NAUSEA: 0
APPETITE CHANGE: 0
DIARRHEA: 0
DIFFICULTY URINATING: 0

## 2019-12-23 NOTE — ED AVS SNAPSHOT
[facility]  [address] [address]  Phone:  [phone]                                    Leanna Zapata   MRN: [id number]    Department:  [facility]   Date of Visit:  12/23/2019           After Visit Summary Signature Page    I have received my discharge instructions, and my questions have been answered. I have discussed any challenges I see with this plan with the nurse or doctor.    ..........................................................................................................................................  Patient/Patient Representative Signature      ..........................................................................................................................................  Patient Representative Print Name and Relationship to Patient    ..................................................               ................................................  Date                                   Time    ..........................................................................................................................................  Reviewed by Signature/Title    ...................................................              ..............................................  Date                                               Time          22EPIC Rev 08/18

## 2019-12-23 NOTE — DISCHARGE INSTRUCTIONS
(R07.89) Chest wall pain  (primary encounter diagnosis)  Comment: Acute, symptomatic  Plan: Given this is third visit for this chest pain labs, EKG obtained.   CBC, BMP, TSH, and ddimer normal. Low risk of PE - normotensive, HR normal, SAO2 normal on room air.   EKG normal.  Given palpable left sided anterior pain - suspecting this is muscular. She does not think it is related to anxiety - could be anxiety contributing to chest wall tenderness.       Recommend symptomatic treatments:   Tylenol 500-650 mg every 4-6 hours  Ibuprofen 600-800 mg every 6-8 hours with food  Topical anessthetic such as biofreeze, bengay, lidocaine to area of discomfort  Heat and/or ice    At this time there is nothing emergent found to be contributing to your symptoms. This does not mean that something emergent may not present.     Return with new or worsening symptoms.    Follow-up in the clinic with your primary care provider.     Ana Powell, CNP

## 2019-12-24 NOTE — ED PROVIDER NOTES
"  History     Chief Complaint   Patient presents with     Chest Pain     \"for about 1 month, [facility] Doctor said it was anxiety this past Thursday\"      Shortness of Breath     \" anxiety med prescribed by [facility] Doctor\"      HPI  Leanna Zapata is a 19 year old female who presents ambulatory accompanied by her boyfriend with concerns of chest pain that is been ongoing for 1 month.  She has associated shortness of breath.  She has been seen 2 other times for this.  She was told it was related to anxiety.  She does not feel like anxiety medication is helping.  Currently rates pain at 4-5 out of 10 constant, sternal.  Coughing, movement increases pain up to 8 out of 10.  She has tried ibuprofen without relief.  She denies any recent URI symptoms.  She currently is not working and lifestyle is sedentary.  She is able to walk down stairs without feeling significant shortness of breath.  She does use medicinal marijuana vaporizer.  She has not used in the last 2 weeks.    Allergies:  Allergies   Allergen Reactions     Bees      Penicillins      GI upset.       Problem List:    There are no active problems to display for this patient.       Past Medical History:    Past Medical History:   Diagnosis Date     Dysthymic disorder      Insomnia        Past Surgical History:    Past Surgical History:   Procedure Laterality Date     OTHER SURGICAL HISTORY      [id number],NO PREVIOUS SURGERY       Family History:    Family History   Problem Relation Age of Onset     Cancer Mother         Cancer,cervical, dx around age of 33yo     Cancer Maternal Grandmother         Cancer,lymph node and breast cancer       Social History:  Marital Status:  Single [1]  Social History     Tobacco Use     Smoking status: Current Every Day Smoker     Packs/day: 0.25     Years: 4.00     Pack years: 1.00     Smokeless tobacco: Never Used     Tobacco comment: States she is cutting back   Substance Use Topics     Alcohol use: Yes     Comment: occationally "     Drug use: Yes     Types: Marijuana, Other     Comment: zanax occationally from somewhere else        Medications:    famotidine (PEPCID) 20 MG tablet  hydrOXYzine (ATARAX) 50 MG tablet  omeprazole (PRILOSEC) 20 MG DR capsule  sodium-potassium bicarbonate (RAZIA-SELTZER GOLD) TBEF solu-tab  zolpidem (AMBIEN) 5 MG tablet          Review of Systems   Constitutional: Negative for appetite change, chills, fatigue and fever.   HENT: Negative for congestion, ear pain, rhinorrhea and sore throat.    Respiratory: Positive for shortness of breath. Negative for cough, chest tightness and wheezing.    Cardiovascular: Positive for chest pain. Negative for palpitations and leg swelling.   Gastrointestinal: Negative for abdominal pain, constipation, diarrhea, nausea and vomiting.   Genitourinary: Negative for difficulty urinating.   Skin: Negative for rash.   Neurological: Negative for headaches.       Physical Exam   BP: 124/81  Pulse: (!) 125  Heart Rate: 85  Temp: 98.6  F (37  C)  Resp: 18  Weight: 72.6 kg (160 lb)  SpO2: 100 %      Physical Exam  Constitutional:       General: She is not in acute distress.     Appearance: Normal appearance. She is not ill-appearing, toxic-appearing or diaphoretic.   HENT:      Head: Normocephalic and atraumatic.      Right Ear: Tympanic membrane, ear canal and external ear normal.      Left Ear: Tympanic membrane, ear canal and external ear normal.      Nose: Nose normal.      Mouth/Throat:      Lips: Pink.      Mouth: Mucous membranes are moist.      Pharynx: Oropharynx is clear. Uvula midline. No pharyngeal swelling, oropharyngeal exudate or posterior oropharyngeal erythema.   Eyes:      General: Lids are normal.      Conjunctiva/sclera: Conjunctivae normal.      Pupils: Pupils are equal, round, and reactive to light.   Neck:      Musculoskeletal: Neck supple.   Cardiovascular:      Rate and Rhythm: Normal rate and regular rhythm.      Heart sounds: S1 normal and S2 normal. No murmur. No  friction rub. No gallop.    Pulmonary:      Effort: Pulmonary effort is normal. No tachypnea or respiratory distress.      Breath sounds: Normal breath sounds. No wheezing, rhonchi or rales.   Lymphadenopathy:      Cervical: No cervical adenopathy.   Skin:     General: Skin is warm and dry.      Capillary Refill: Capillary refill takes less than 2 seconds.      Coloration: Skin is not pale.      Findings: No rash.   Neurological:      Mental Status: She is alert and oriented to person, place, and time.   Psychiatric:         Mood and Affect: Mood normal.         Speech: Speech normal.         Behavior: Behavior normal. Behavior is cooperative.         ED Course        Procedures               EKG Interpretation:      Interpreted by Ana Powell CNP  Time reviewed: 1530  Symptoms at time of EKG: chest pain   Rhythm: normal sinus   Rate: normal  Axis: normal  Ectopy: normal  Conduction: normal  ST Segments/ T Waves: No ST-T wave changes  Q Waves: none  Comparison to prior: No old EKG available    Clinical Impression: normal EKG         Results for orders placed or performed during the hospital encounter of 12/23/19 (from the past 24 hour(s))   CBC with platelets differential   Result Value Ref Range    WBC 10.3 4.0 - 11.0 10e9/L    RBC Count 4.83 3.8 - 5.2 10e12/L    Hemoglobin 14.0 11.7 - 15.7 g/dL    Hematocrit 40.2 35.0 - 47.0 %    MCV 83 78 - 100 fl    MCH 29.0 26.5 - 33.0 pg    MCHC 34.8 31.5 - 36.5 g/dL    RDW 12.8 10.0 - 15.0 %    Platelet Count 228 150 - 450 10e9/L    Diff Method Automated Method     % Neutrophils 73.0 %    % Lymphocytes 20.1 %    % Monocytes 5.7 %    % Eosinophils 0.5 %    % Basophils 0.3 %    % Immature Granulocytes 0.4 %    Nucleated RBCs 0 0 /100    Absolute Neutrophil 7.5 1.6 - 8.3 10e9/L    Absolute Lymphocytes 2.1 0.8 - 5.3 10e9/L    Absolute Monocytes 0.6 0.0 - 1.3 10e9/L    Absolute Eosinophils 0.1 0.0 - 0.7 10e9/L    Absolute Basophils 0.0 0.0 - 0.2 10e9/L    Abs Immature  Granulocytes 0.0 0 - 0.4 10e9/L    Absolute Nucleated RBC 0.0    Basic metabolic panel   Result Value Ref Range    Sodium 142 133 - 144 mmol/L    Potassium 3.5 3.4 - 5.3 mmol/L    Chloride 113 (H) 94 - 109 mmol/L    Carbon Dioxide 21 20 - 32 mmol/L    Anion Gap 8 3 - 14 mmol/L    Glucose 94 70 - 99 mg/dL    Urea Nitrogen 8 7 - 30 mg/dL    Creatinine 0.84 0.52 - 1.04 mg/dL    GFR Estimate >90 >60 mL/min/[1.73_m2]    GFR Estimate If Black >90 >60 mL/min/[1.73_m2]    Calcium 9.2 8.5 - 10.1 mg/dL   D dimer quantitative   Result Value Ref Range    D Dimer <0.3 0.0 - 0.50 ug/ml FEU   TSH with free T4 reflex   Result Value Ref Range    TSH 0.92 0.40 - 4.00 mU/L       Medications - No data to display    Assessments & Plan (with Medical Decision Making)     I have reviewed the nursing notes.    I have reviewed the findings, diagnosis, plan and need for follow up with the patient.  (R07.89) Chest wall pain  (primary encounter diagnosis)  Comment: Acute, symptomatic  Plan: Given this is third visit for this chest pain labs, EKG obtained.   CBC, BMP, TSH, and ddimer normal. Low risk of PE - normotensive, HR normal, SAO2 normal on room air.   EKG normal. Respirations are easy, even, unlabored. No postured breathing. No audible wheezing. Lung sounds are clear.   Given palpable left sided anterior pain - suspecting this is muscular. She does not think it is related to anxiety - could be anxiety contributing to chest wall tenderness.       Recommend symptomatic treatments:   Tylenol 500-650 mg every 4-6 hours  Ibuprofen 600-800 mg every 6-8 hours with food  Topical anessthetic such as biofreeze, bengay, lidocaine to area of discomfort  Heat and/or ice    At this time there is nothing emergent found to be contributing to your symptoms. This does not mean that something emergent may not present.     (Z78.9) Use of non-nicotine containing substance in combustion-free vaporization device  Comment: Chronic  Plan: Highly recommended  cessation of vaporizer use as well as marijuana use.     Return with new or worsening symptoms.    Follow-up in the clinic with your primary care provider.     Discharge Medication List as of 12/23/2019  5:23 PM          Final diagnoses:   Chest wall pain     Ana Powell CNP  12/23/2019   [facility]     Ana Powell CNP  12/23/19 8988

## 2020-06-01 ENCOUNTER — TELEPHONE (OUTPATIENT)
Dept: OBGYN | Facility: OTHER | Age: 20
End: 2020-06-01

## 2020-06-01 NOTE — TELEPHONE ENCOUNTER
2:30 PM    Reason for Call: Phone Call    Description:pt needs Depo and had been getting them at [facility] and now they won't give her one      Was an appointment offered for this call? No  If yes : Appointment type              Date    Preferred method for responding to this message: Telephone Call  What is your phone number ? [phone]    If we cannot reach you directly, may we leave a detailed response at the number you provided? Yes    Can this message wait until your PCP/provider returns, if available today? YES, No, provider in    Majo White

## 2020-06-03 ENCOUNTER — OFFICE VISIT (OUTPATIENT)
Dept: OBGYN | Facility: OTHER | Age: 20
End: 2020-06-03
Payer: COMMERCIAL

## 2020-06-03 VITALS
BODY MASS INDEX: 43.43 KG/M2 | WEIGHT: 230 LBS | SYSTOLIC BLOOD PRESSURE: 108 MMHG | DIASTOLIC BLOOD PRESSURE: 62 MMHG | HEIGHT: 61 IN

## 2020-06-03 DIAGNOSIS — Z30.42 ENCOUNTER FOR SURVEILLANCE OF INJECTABLE CONTRACEPTIVE: Primary | ICD-10-CM

## 2020-06-03 PROBLEM — F41.1 GENERALIZED ANXIETY DISORDER: Status: ACTIVE | Noted: 2019-04-22

## 2020-06-03 PROBLEM — R07.81 RIB PAIN: Status: ACTIVE | Noted: 2020-02-04

## 2020-06-03 PROBLEM — Z91.51 HISTORY OF SUICIDE ATTEMPT: Status: ACTIVE | Noted: 2017-11-27

## 2020-06-03 PROBLEM — F17.200 TOBACCO DEPENDENCE: Status: RESOLVED | Noted: 2019-05-29 | Resolved: 2020-06-03

## 2020-06-03 PROBLEM — F17.200 TOBACCO DEPENDENCE: Status: ACTIVE | Noted: 2019-05-29

## 2020-06-03 PROBLEM — G89.29 CHRONIC LEFT-SIDED THORACIC BACK PAIN: Status: ACTIVE | Noted: 2020-02-04

## 2020-06-03 PROBLEM — M54.6 CHRONIC LEFT-SIDED THORACIC BACK PAIN: Status: ACTIVE | Noted: 2020-02-04

## 2020-06-03 PROCEDURE — 99201 ZZC OFFICE/OUTPT VISIT, NEW, LEVEL I: CPT | Performed by: ADVANCED PRACTICE MIDWIFE

## 2020-06-03 RX ORDER — MEDROXYPROGESTERONE ACETATE 150 MG/ML
150 INJECTION, SUSPENSION INTRAMUSCULAR
Status: ACTIVE | OUTPATIENT
Start: 2020-06-03

## 2020-06-03 RX ORDER — DOXEPIN HYDROCHLORIDE 10 MG/1
CAPSULE ORAL
COMMUNITY
Start: 2020-05-20

## 2020-06-03 RX ORDER — MEDROXYPROGESTERONE ACETATE 150 MG/ML
150 INJECTION, SUSPENSION INTRAMUSCULAR
Qty: 1 ML | Refills: 3 | OUTPATIENT
Start: 2020-06-03 | End: 2020-06-03 | Stop reason: ALTCHOICE

## 2020-06-03 RX ADMIN — MEDROXYPROGESTERONE ACETATE 150 MG: 150 INJECTION, SUSPENSION INTRAMUSCULAR at 15:17

## 2020-06-03 ASSESSMENT — PAIN SCALES - GENERAL: PAINLEVEL: SEVERE PAIN (6)

## 2020-06-03 ASSESSMENT — MIFFLIN-ST. JEOR: SCORE: 1750.65

## 2020-06-03 NOTE — PROGRESS NOTES
Leanna Zapata is a 19 year old female  Here today for contraception management.  Pt is currently using the Depo shot.  Reports Depo is working well without adverse side effects.  Declines STI screening today.  Pt reports being in a monogamic relationship for 3 years.       O:   There were no vitals taken for this visit.   Pleasant without acute distress    A:    Contraception management  Depo shot due tomorrow  Declines STI screening    P:    Depo shot  Offered STI screening    Total visit greater than 10 minutes with 5 minutes spent face to face counseling this patient Depo shot for family planning, bleeding control, calcium supplement, sexually transmitted infections, screening.    CHEL Guthrie, CNM

## 2020-06-03 NOTE — NURSING NOTE
"Chief Complaint   Patient presents with     Contraception     Depo        Initial /62 (BP Location: Left arm, Patient Position: Chair, Cuff Size: Adult Regular)   Ht 1.549 m (5' 1\")   Wt 104.3 kg (230 lb)   BMI 43.46 kg/m   Estimated body mass index is 43.46 kg/m  as calculated from the following:    Height as of this encounter: 1.549 m (5' 1\").    Weight as of this encounter: 104.3 kg (230 lb).  Medication Reconciliation: complete  Mela Garner LPN    "

## 2020-06-03 NOTE — PATIENT INSTRUCTIONS
Return to office in one year for well woman care and as needed.    Thank you for allowing Charly MILLIGAN CNM and our OB team to participate in your care.  If you have a scheduling or an appointment question please contact [facility] Unit Coordinator at their direct line [phone].   ALL nursing questions or concerns can be directed to your OB nurse at: [phone] [address]

## 2020-06-03 NOTE — PROGRESS NOTES
Clinic Administered Medication Documentation      Depo Provera Documentation    URINE HCG: not indicated    Depo-Provera Standing Order inclusion/exclusion criteria reviewed.   Patient meets: inclusion criteria     BP: 108/62  LAST PAP/EXAM: No results found for: PAP    Prior to injection, verified patient identity using patient's name and date of birth. Medication was administered. Please see MAR and medication order for additional information.     Was entire vial of medication used? Yes  Vial/Syringe: Single dose vial  Expiration Date:  11/2021    Patient instructed to stay in clinic after the injection but patient declined.  NEXT INJECTION DUE: 8/19/20 - 9/2/20    Mela Garner LPN

## 2020-08-20 ENCOUNTER — ALLIED HEALTH/NURSE VISIT (OUTPATIENT)
Dept: ALLERGY | Facility: OTHER | Age: 20
End: 2020-08-20
Attending: ADVANCED PRACTICE MIDWIFE
Payer: COMMERCIAL

## 2020-08-20 DIAGNOSIS — Z30.42 ENCOUNTER FOR SURVEILLANCE OF INJECTABLE CONTRACEPTIVE: Primary | ICD-10-CM

## 2020-08-20 PROCEDURE — 96372 THER/PROPH/DIAG INJ SC/IM: CPT

## 2020-08-20 RX ADMIN — MEDROXYPROGESTERONE ACETATE 150 MG: 150 INJECTION, SUSPENSION INTRAMUSCULAR at 15:02

## 2020-08-20 NOTE — PROGRESS NOTES
Clinic Administered Medication Documentation      Depo Provera Documentation    URINE HCG: not indicated    Depo-Provera Standing Order inclusion/exclusion criteria reviewed.   Patient meets: inclusion criteria     BP: Data Unavailable  LAST PAP/EXAM: No results found for: PAP    Prior to injection, verified patient identity using patient's name and date of birth. Medication was administered. Please see MAR and medication order for additional information.     Was entire vial of medication used? Yes  Vial/Syringe: Single dose vial  Expiration Date:  11/2021    Patient instructed to report any adverse reaction to staff immediately .  NEXT INJECTION DUE: 11/5/20 - 11/19/20    Left deltoid  TAMMIE PADRON LPN

## 2020-11-18 ENCOUNTER — ALLIED HEALTH/NURSE VISIT (OUTPATIENT)
Dept: ALLERGY | Facility: OTHER | Age: 20
End: 2020-11-18
Attending: ADVANCED PRACTICE MIDWIFE
Payer: COMMERCIAL

## 2020-11-18 DIAGNOSIS — Z30.42 ENCOUNTER FOR SURVEILLANCE OF INJECTABLE CONTRACEPTIVE: Primary | ICD-10-CM

## 2020-11-18 PROCEDURE — 96372 THER/PROPH/DIAG INJ SC/IM: CPT

## 2020-11-18 RX ADMIN — MEDROXYPROGESTERONE ACETATE 150 MG: 150 INJECTION, SUSPENSION INTRAMUSCULAR at 16:15

## 2020-11-18 NOTE — PROGRESS NOTES
Clinic Administered Medication Documentation      Depo Provera Documentation    URINE HCG: not indicated    Depo-Provera Standing Order inclusion/exclusion criteria reviewed.   Patient meets: inclusion criteria     BP: Data Unavailable  LAST PAP/EXAM: No results found for: PAP    Prior to injection, verified patient identity using patient's name and date of birth. Medication was administered. Please see MAR and medication order for additional information.     Was entire vial of medication used? Yes  Vial/Syringe: Single dose vial  Expiration Date:  11/2021    Patient instructed to report any adverse reaction to staff immediately .  NEXT INJECTION DUE: 2/3/21 - 2/17/21    Given right deltoid.    Laura Nuñez, RN

## 2024-08-05 ENCOUNTER — LAB REQUISITION (OUTPATIENT)
Dept: LAB | Facility: HOSPITAL | Age: 24
End: 2024-08-05

## 2024-08-05 DIAGNOSIS — M25.472 EFFUSION, LEFT ANKLE: ICD-10-CM

## 2024-08-05 LAB — D DIMER PPP FEU-MCNC: 0.55 UG/ML FEU (ref 0–0.5)

## 2024-08-05 PROCEDURE — 85379 FIBRIN DEGRADATION QUANT: CPT | Performed by: NURSE PRACTITIONER
